# Patient Record
Sex: FEMALE | Race: OTHER | URBAN - METROPOLITAN AREA
[De-identification: names, ages, dates, MRNs, and addresses within clinical notes are randomized per-mention and may not be internally consistent; named-entity substitution may affect disease eponyms.]

---

## 2018-12-20 ENCOUNTER — APPOINTMENT (OUTPATIENT)
Dept: URBAN - METROPOLITAN AREA SURGERY 9 | Age: 68
Setting detail: DERMATOLOGY
End: 2018-12-20

## 2018-12-20 VITALS — SYSTOLIC BLOOD PRESSURE: 164 MMHG | DIASTOLIC BLOOD PRESSURE: 98 MMHG | HEART RATE: 72 BPM | RESPIRATION RATE: 14 BRPM

## 2018-12-20 PROBLEM — M12.9 ARTHROPATHY, UNSPECIFIED: Status: ACTIVE | Noted: 2018-12-20

## 2018-12-20 PROBLEM — Z85.828 PERSONAL HISTORY OF OTHER MALIGNANT NEOPLASM OF SKIN: Status: ACTIVE | Noted: 2018-12-20

## 2018-12-20 PROBLEM — I10 ESSENTIAL (PRIMARY) HYPERTENSION: Status: ACTIVE | Noted: 2018-12-20

## 2018-12-20 PROBLEM — Z85.820 PERSONAL HISTORY OF MALIGNANT MELANOMA OF SKIN: Status: ACTIVE | Noted: 2018-12-20

## 2018-12-20 PROBLEM — C44.319 BASAL CELL CARCINOMA OF SKIN OF OTHER PARTS OF FACE: Status: ACTIVE | Noted: 2018-12-20

## 2018-12-20 PROCEDURE — OTHER RETURN TO REFERRING PROVIDER: OTHER

## 2018-12-20 PROCEDURE — OTHER CONSULTATION FOR MOHS SURGERY: OTHER

## 2018-12-20 PROCEDURE — OTHER MOHS SURGERY: OTHER

## 2018-12-20 PROCEDURE — 17311 MOHS 1 STAGE H/N/HF/G: CPT

## 2018-12-20 NOTE — PROCEDURE: CONSULTATION FOR MOHS SURGERY
Incorporate Mauc In Note: Yes
Name Of The Referring Provider For Procedure: Dr. Taveras
Detail Level: Detailed
X Size Of Lesion In Cm (Optional): 0
Body Location Override (Optional - Billing Will Still Be Based On Selected Body Map Location If Applicable): right medial canthus
Anatomic Location From Referring Provider: right medial inferior canthus

## 2018-12-20 NOTE — HPI: MOHS SURGERY CONSULTATION
Has The Cancer Been Biopsied Before?: has been previously biopsied
Who Is Your Referring Provider?: Dr. Sherman

## 2019-01-08 ENCOUNTER — APPOINTMENT (OUTPATIENT)
Dept: URBAN - METROPOLITAN AREA SURGERY 9 | Age: 69
Setting detail: DERMATOLOGY
End: 2019-01-08

## 2019-01-08 DIAGNOSIS — Z48.02 ENCOUNTER FOR REMOVAL OF SUTURES: ICD-10-CM

## 2019-01-08 PROCEDURE — 99024 POSTOP FOLLOW-UP VISIT: CPT

## 2019-01-08 PROCEDURE — OTHER OTHER: OTHER

## 2019-01-08 NOTE — PROCEDURE: OTHER
Other (Free Text): Two spitting sutures removed. Healing well, no symptoms of infection.
Detail Level: Detailed
Note Text (......Xxx Chief Complaint.): This diagnosis correlates with the
